# Patient Record
Sex: FEMALE | Race: WHITE | NOT HISPANIC OR LATINO | URBAN - METROPOLITAN AREA
[De-identification: names, ages, dates, MRNs, and addresses within clinical notes are randomized per-mention and may not be internally consistent; named-entity substitution may affect disease eponyms.]

---

## 2019-02-15 ENCOUNTER — EMERGENCY (EMERGENCY)
Facility: HOSPITAL | Age: 49
LOS: 0 days | Discharge: HOME | End: 2019-02-16
Attending: EMERGENCY MEDICINE | Admitting: EMERGENCY MEDICINE

## 2019-02-15 VITALS
DIASTOLIC BLOOD PRESSURE: 76 MMHG | TEMPERATURE: 97 F | OXYGEN SATURATION: 99 % | HEART RATE: 62 BPM | RESPIRATION RATE: 20 BRPM | SYSTOLIC BLOOD PRESSURE: 132 MMHG

## 2019-02-15 DIAGNOSIS — R07.9 CHEST PAIN, UNSPECIFIED: ICD-10-CM

## 2019-02-15 DIAGNOSIS — R06.02 SHORTNESS OF BREATH: ICD-10-CM

## 2019-02-15 DIAGNOSIS — R07.89 OTHER CHEST PAIN: ICD-10-CM

## 2019-02-16 VITALS
HEART RATE: 56 BPM | TEMPERATURE: 97 F | SYSTOLIC BLOOD PRESSURE: 128 MMHG | DIASTOLIC BLOOD PRESSURE: 76 MMHG | OXYGEN SATURATION: 99 % | RESPIRATION RATE: 19 BRPM

## 2019-02-16 LAB
ANION GAP SERPL CALC-SCNC: 13 MMOL/L — SIGNIFICANT CHANGE UP (ref 7–14)
BASOPHILS # BLD AUTO: 0.05 K/UL — SIGNIFICANT CHANGE UP (ref 0–0.2)
BASOPHILS NFR BLD AUTO: 0.8 % — SIGNIFICANT CHANGE UP (ref 0–1)
BUN SERPL-MCNC: 11 MG/DL — SIGNIFICANT CHANGE UP (ref 10–20)
CALCIUM SERPL-MCNC: 9.1 MG/DL — SIGNIFICANT CHANGE UP (ref 8.5–10.1)
CHLORIDE SERPL-SCNC: 103 MMOL/L — SIGNIFICANT CHANGE UP (ref 98–110)
CO2 SERPL-SCNC: 25 MMOL/L — SIGNIFICANT CHANGE UP (ref 17–32)
CREAT SERPL-MCNC: 0.8 MG/DL — SIGNIFICANT CHANGE UP (ref 0.7–1.5)
EOSINOPHIL # BLD AUTO: 0.24 K/UL — SIGNIFICANT CHANGE UP (ref 0–0.7)
EOSINOPHIL NFR BLD AUTO: 3.8 % — SIGNIFICANT CHANGE UP (ref 0–8)
GLUCOSE SERPL-MCNC: 96 MG/DL — SIGNIFICANT CHANGE UP (ref 70–99)
HCT VFR BLD CALC: 36.1 % — LOW (ref 37–47)
HGB BLD-MCNC: 11.8 G/DL — LOW (ref 12–16)
IMM GRANULOCYTES NFR BLD AUTO: 0.3 % — SIGNIFICANT CHANGE UP (ref 0.1–0.3)
LYMPHOCYTES # BLD AUTO: 1.88 K/UL — SIGNIFICANT CHANGE UP (ref 1.2–3.4)
LYMPHOCYTES # BLD AUTO: 29.6 % — SIGNIFICANT CHANGE UP (ref 20.5–51.1)
MCHC RBC-ENTMCNC: 30.8 PG — SIGNIFICANT CHANGE UP (ref 27–31)
MCHC RBC-ENTMCNC: 32.7 G/DL — SIGNIFICANT CHANGE UP (ref 32–37)
MCV RBC AUTO: 94.3 FL — SIGNIFICANT CHANGE UP (ref 81–99)
MONOCYTES # BLD AUTO: 0.58 K/UL — SIGNIFICANT CHANGE UP (ref 0.1–0.6)
MONOCYTES NFR BLD AUTO: 9.1 % — SIGNIFICANT CHANGE UP (ref 1.7–9.3)
NEUTROPHILS # BLD AUTO: 3.58 K/UL — SIGNIFICANT CHANGE UP (ref 1.4–6.5)
NEUTROPHILS NFR BLD AUTO: 56.4 % — SIGNIFICANT CHANGE UP (ref 42.2–75.2)
NRBC # BLD: 0 /100 WBCS — SIGNIFICANT CHANGE UP (ref 0–0)
PLATELET # BLD AUTO: 233 K/UL — SIGNIFICANT CHANGE UP (ref 130–400)
POTASSIUM SERPL-MCNC: 4.2 MMOL/L — SIGNIFICANT CHANGE UP (ref 3.5–5)
POTASSIUM SERPL-SCNC: 4.2 MMOL/L — SIGNIFICANT CHANGE UP (ref 3.5–5)
RBC # BLD: 3.83 M/UL — LOW (ref 4.2–5.4)
RBC # FLD: 14.1 % — SIGNIFICANT CHANGE UP (ref 11.5–14.5)
SODIUM SERPL-SCNC: 141 MMOL/L — SIGNIFICANT CHANGE UP (ref 135–146)
TROPONIN T SERPL-MCNC: <0.01 NG/ML — SIGNIFICANT CHANGE UP
WBC # BLD: 6.35 K/UL — SIGNIFICANT CHANGE UP (ref 4.8–10.8)
WBC # FLD AUTO: 6.35 K/UL — SIGNIFICANT CHANGE UP (ref 4.8–10.8)

## 2019-02-16 NOTE — ED ADULT NURSE NOTE - NSIMPLEMENTINTERV_GEN_ALL_ED
Implemented All Universal Safety Interventions:  [place] to call system. Call bell, personal items and telephone within reach. Instruct patient to call for assistance. Room bathroom lighting operational. Non-slip footwear when patient is off stretcher. Physically safe environment: no spills, clutter or unnecessary equipment. Stretcher in lowest position, wheels locked, appropriate side rails in place.

## 2019-02-16 NOTE — ED ADULT NURSE NOTE - OBJECTIVE STATEMENT
Pt c/o mid sternal chest pain, non radiating for the past day with associated SOB. No abdominal pain, no headaches, no arm pain, back pain. No NVD.

## 2019-02-16 NOTE — ED PROVIDER NOTE - INTERPRETATION
Janes White MD    [id number] [place], [address] [place]    Office [phone],IER [phone]  Pager [phone] is a 77 y.o. female is here for f/u visit for CAD    Primary care physician:  Eliodoro Gaucher, MD    Patient Active Problem List   Diagnosis Code    CAD (coronary artery disease) I25.10    Dyslipidemia E78.5    Claudication, intermittent (HCC) I73.9    Femoral bruit R09.89    Pain R52    COPD (chronic obstructive pulmonary disease) ([facility] [address] J44.9    Lung cancer ([facility] [address] C34.90    Atherosclerosis of native arteries of the extremities with intermittent claudication I70.219       Chief complaint:  Chief Complaint   Patient presents with    Coronary Artery Disease     C/o nausea  Left side arm/shoulder pain        Assessment:    Aypical CP -/left arm pain-patient has been told that she needed injections in her neck but she has declined. Probably musculoskeletal etiology. EKG no new changes. Hx of PVD - Intermittent claudication R>L ; S/p L to R fem fem bypass 6/14/13 ( Dr May Kumar) complicated by  graft infection - c/o bilat leg pain  Hx of  CAD; UA (10/10/12) s/p PCI to mid LAD with SHARON ( xience 2.25by18); RCA -40%; Lcflex MLI; EF 45%; ECHO 10/11/12 -EF 60% ; ECHO ( prior) - Mild AR/ PFO  HLD  COPD   Hx of Ca lung s/p resection ( in remission/no radiation/chemo)   Anxiety /Depression -continues to be anxious during office visit  Hx of chronic Low Back Pain - MRI - DJD with disc disease- followed by Dr Joon Lujan - has been advised PT/\"injections to back\"  Dyspnea    Plan:     Missy nuc - nml/Nml EF  Continue cardiac meds  Pt currently taking statin - lipids per PCP  Patient is not on beta-blockers. She was briefly on midodrine for orthostatic hypotension and has not been able to tolerate antihypertensive medication.   Continue to f/u specialists - ortho/psychiatrist/vascular surgeon  F/u 6 mths     I appreciate the opportunity to be involved in [facility]. See note below for details. Please do not hesitate to contact us with questions or concerns. Omar Pelletier MD    Cardiac Testing/ Procedures: A. Cardiac Cath/PCI:10/10/12Prox/mid LAD - tubular lesion  Successful PCI with SHARON (Xience 2.5by 18mm)  -- Global left ventricular function was mildly depressed. EF calculated  by contrast ventriculography was 45 %. Probable LVH  -- Mid LAD: There was a 85 % stenosis. -- Proximal RCA: There was a 40 % stenosis. B.ECHO/RICHARD: 1/27/16 Procedure information: Technically limited study due to off axis window. Apical window was located 3rd intercostal extreme lateral towards back. Left ventricle: Systolic function was normal. Ejection fraction was  estimated in the range of 50 % to 55 %. There were no regional wall motion  abnormalities. Doppler parameters were consistent with abnormal left  ventricular relaxation (grade 1 diastolic dysfunction). Aortic valve: There was mild regurgitation. Pulmonic valve: There was mild to moderate regurgitation  10/11/12   Left ventricle: Size was normal. Systolic function was normal. Ejection  fraction was estimated to be 60 %. There were no regional wall motion  abnormalities. Doppler parameters were consistent with abnormal left  ventricular relaxation (grade 1 diastolic dysfunction). Right ventricle: The size was normal. Systolic function was normal.    Atrial septum: There was a small left-to-right shunt on color Doppler and  right to left shunt on agitated saline study, suggestive of a patent  foramen ovale. Aortic valve: Regurgitation grade was 1+ on a scale of 0 to 4+. C.StressNuclear/Stress ECHO/Stress test:  May 24, 16 - LVEF 73%/Nml Missy nuc  D. Vascular:11/8/12   R VALDO 0.58/Inflow and fempop level of dz; L VALDO 0.96   5/17/13 Common iliac artery - R - 100% occluded. 3 vessel run off bilateral  E. EP:   F. Miscellaneous: 6/14/13 Left to right femorofemoral bypass graft. ( Dr Jh Castro [place])      Subjective:  Aakash Parada is a 77 y.o. female who returns for follow up visit. C/o numbness L UE./neck pain  Sometimes has sudden pain L chest with certain movements  C/o bilat LE pain - scheduled to see Dr Jeaneth Ontiveros. Denies swelling LE   Pt not active sec to pain issues. Continues to be anxious /tearful during office visit      ROS:  (bold if positive, if negative)     Pain LE/Joints            Current Outpatient Prescriptions   Medication Sig Dispense    montelukast (SINGULAIR) 10 mg tablet Take 10 mg by mouth daily.  mometasone-formoterol (DULERA) 100-5 mcg/actuation HFA inhaler Take 2 Puffs by inhalation as needed.  pantoprazole (PROTONIX) 20 mg tablet Take 20 mg by mouth daily.  clopidogrel (PLAVIX) 75 mg tablet Take 1 Tab by mouth daily. 30 Tab    cholecalciferol, vitamin D3, 2,000 unit tab Take  by mouth every other day.  polyethylene glycol (MIRALAX) 17 gram/dose powder Take 17 g by mouth every other day.  aspirin delayed-release 81 mg tablet Take  by mouth daily.  QUEtiapine (SEROQUEL) 100 mg tablet Take 50 mg by mouth nightly.  escitalopram oxalate (LEXAPRO) 10 mg tablet Take 10 mg by mouth daily.  traMADol (ULTRAM) 50 mg tablet Take 50 mg by mouth as needed for Pain.  simvastatin (ZOCOR) 40 mg tablet Take 40 mg by mouth nightly.  promethazine (PHENERGAN) 25 mg tablet Take 1 tablet by mouth every six (6) hours as needed for Nausea. 15 tablet    ALPRAZolam (XANAX) 1 mg tablet Take 0.5 mg by mouth three (3) times daily.  traZODone (DESYREL) 100 mg tablet Take 50 mg by mouth nightly.  tiotropium (SPIRIVA WITH HANDIHALER) 18 mcg inhalation capsule Take 1 Cap by inhalation daily. No current facility-administered medications for this visit.         Physical Exam:  Visit Vitals    /64 (BP 1 Location: Left arm, BP Patient Position: Sitting)    Pulse 90    Resp 18    Ht 5' (1.524 m)    Wt 127 lb (57.6 kg)    SpO2 99%    BMI 24.8 kg/m2          Gen: Well-developed, elderly, tearful  Neck: Supple,No JVD, No Carotid Bruit,   Resp: No accessory muscle use, Clear breath sounds, No rales or rhonchi  Card: Regular Rate,Rythm,Normal S1, S2, 2/6 sys murmur+, No rubs or gallop. No thrills. Abd:  Soft, non-tender, non-distended,BS+,   MSK: No cyanosis. L hip tenderness+  Skin: No rashes    Neuro: moving all four extremities , follows commands appropriately  Psych:   oriented to person, place , alert, Tearful  LE: No edema    EKG: Sinus rhythm, T-wave changes anterior leads.   (No changes from prior EKG 3/2016)      LABS:        Lab Results   Component Value Date/Time    WBC 7.3 03/11/2016 05:47 PM    HGB 12.0 03/11/2016 05:47 PM    HCT 36.8 03/11/2016 05:47 PM    PLATELET 753 83/70/1086 05:47 PM    MCV 90.9 03/11/2016 05:47 PM     Lab Results   Component Value Date/Time    Sodium 140 03/11/2016 05:47 PM    Potassium 3.8 03/11/2016 05:47 PM    Chloride 106 03/11/2016 05:47 PM    CO2 28 03/11/2016 05:47 PM    Anion gap 6 03/11/2016 05:47 PM    Glucose 84 03/11/2016 05:47 PM    BUN 11 03/11/2016 05:47 PM    Creatinine 1.05 03/11/2016 05:47 PM    BUN/Creatinine ratio 10 03/11/2016 05:47 PM    GFR est AA >60 03/11/2016 05:47 PM    GFR est non-AA 51 03/11/2016 05:47 PM    Calcium 8.9 03/11/2016 05:47 PM       Lab Results   Component Value Date/Time    aPTT 24.7 09/17/2013 01:43 PM     Lab Results   Component Value Date/Time    INR 1.0 03/11/2016 05:47 PM    INR 1.1 09/17/2013 01:43 PM    INR 1.1 06/10/2013 03:58 PM    Prothrombin time 10.4 03/11/2016 05:47 PM    Prothrombin time 11.0 09/17/2013 01:43 PM    Prothrombin time 11.0 06/10/2013 03:58 PM     No components found for: LAST LIPIDS        Janes White MD  Sinus rhythm, 1st deg avb

## 2019-02-16 NOTE — ED PROVIDER NOTE - PROGRESS NOTE DETAILS
pt refusing to stay for further w/u. originally put in EDOU but will cancel. R/B/A explained. aware can return at any time if she changes her mind.

## 2019-02-16 NOTE — ED PROVIDER NOTE - OBJECTIVE STATEMENT
49 yo f, hx of RA on MTX. co cp , mid sternal non rad. assoc with sob. she saw her pmd and sent in for r/o acs. no fever, chills, cough. no ab pain. no leg pain or swelling. no méndez.